# Patient Record
Sex: MALE | Race: WHITE | NOT HISPANIC OR LATINO | Employment: FULL TIME | URBAN - METROPOLITAN AREA
[De-identification: names, ages, dates, MRNs, and addresses within clinical notes are randomized per-mention and may not be internally consistent; named-entity substitution may affect disease eponyms.]

---

## 2022-09-26 ENCOUNTER — APPOINTMENT (OUTPATIENT)
Dept: GENERAL RADIOLOGY | Facility: CLINIC | Age: 32
End: 2022-09-26
Attending: FAMILY MEDICINE
Payer: COMMERCIAL

## 2022-09-26 ENCOUNTER — HOSPITAL ENCOUNTER (EMERGENCY)
Facility: CLINIC | Age: 32
Discharge: HOME | End: 2022-09-26
Attending: FAMILY MEDICINE | Admitting: FAMILY MEDICINE
Payer: COMMERCIAL

## 2022-09-26 VITALS
SYSTOLIC BLOOD PRESSURE: 134 MMHG | DIASTOLIC BLOOD PRESSURE: 98 MMHG | RESPIRATION RATE: 18 BRPM | WEIGHT: 182 LBS | HEART RATE: 62 BPM | BODY MASS INDEX: 23.69 KG/M2 | TEMPERATURE: 97.6 F | OXYGEN SATURATION: 99 %

## 2022-09-26 DIAGNOSIS — R07.9 CHEST PAIN, UNSPECIFIED TYPE: ICD-10-CM

## 2022-09-26 LAB
ALBUMIN SERPL-MCNC: 3.9 G/DL (ref 3.4–5)
ALP SERPL-CCNC: 60 U/L (ref 40–150)
ALT SERPL W P-5'-P-CCNC: 39 U/L (ref 0–70)
ANION GAP SERPL CALCULATED.3IONS-SCNC: 8 MMOL/L (ref 3–14)
APTT PPP: 27 SECONDS (ref 22–38)
AST SERPL W P-5'-P-CCNC: 27 U/L (ref 0–45)
ATRIAL RATE - MUSE: 72 BPM
BASOPHILS # BLD AUTO: 0.1 10E3/UL (ref 0–0.2)
BASOPHILS NFR BLD AUTO: 1 %
BILIRUB SERPL-MCNC: 0.6 MG/DL (ref 0.2–1.3)
BUN SERPL-MCNC: 9 MG/DL (ref 7–30)
CALCIUM SERPL-MCNC: 9.2 MG/DL (ref 8.5–10.1)
CHLORIDE BLD-SCNC: 104 MMOL/L (ref 94–109)
CO2 SERPL-SCNC: 26 MMOL/L (ref 20–32)
CREAT SERPL-MCNC: 0.64 MG/DL (ref 0.66–1.25)
D DIMER PPP FEU-MCNC: <0.27 UG/ML FEU (ref 0–0.5)
DIASTOLIC BLOOD PRESSURE - MUSE: NORMAL MMHG
EOSINOPHIL # BLD AUTO: 0.1 10E3/UL (ref 0–0.7)
EOSINOPHIL NFR BLD AUTO: 2 %
ERYTHROCYTE [DISTWIDTH] IN BLOOD BY AUTOMATED COUNT: 12.8 % (ref 10–15)
GFR SERPL CREATININE-BSD FRML MDRD: >90 ML/MIN/1.73M2
GLUCOSE BLD-MCNC: 108 MG/DL (ref 70–99)
HCT VFR BLD AUTO: 44.3 % (ref 40–53)
HGB BLD-MCNC: 15.4 G/DL (ref 13.3–17.7)
HOLD SPECIMEN: NORMAL
IMM GRANULOCYTES # BLD: 0 10E3/UL
IMM GRANULOCYTES NFR BLD: 0 %
INR PPP: 0.94 (ref 0.85–1.15)
INTERPRETATION ECG - MUSE: NORMAL
LYMPHOCYTES # BLD AUTO: 1.2 10E3/UL (ref 0.8–5.3)
LYMPHOCYTES NFR BLD AUTO: 20 %
MCH RBC QN AUTO: 32 PG (ref 26.5–33)
MCHC RBC AUTO-ENTMCNC: 34.8 G/DL (ref 31.5–36.5)
MCV RBC AUTO: 92 FL (ref 78–100)
MONOCYTES # BLD AUTO: 0.5 10E3/UL (ref 0–1.3)
MONOCYTES NFR BLD AUTO: 8 %
NEUTROPHILS # BLD AUTO: 4.2 10E3/UL (ref 1.6–8.3)
NEUTROPHILS NFR BLD AUTO: 69 %
NRBC # BLD AUTO: 0 10E3/UL
NRBC BLD AUTO-RTO: 0 /100
NT-PROBNP SERPL-MCNC: 34 PG/ML (ref 0–450)
P AXIS - MUSE: 58 DEGREES
PLATELET # BLD AUTO: 287 10E3/UL (ref 150–450)
POTASSIUM BLD-SCNC: 3.9 MMOL/L (ref 3.4–5.3)
PR INTERVAL - MUSE: 152 MS
PROT SERPL-MCNC: 7.3 G/DL (ref 6.8–8.8)
QRS DURATION - MUSE: 90 MS
QT - MUSE: 374 MS
QTC - MUSE: 409 MS
R AXIS - MUSE: 72 DEGREES
RBC # BLD AUTO: 4.82 10E6/UL (ref 4.4–5.9)
SARS-COV-2 RNA RESP QL NAA+PROBE: NEGATIVE
SODIUM SERPL-SCNC: 138 MMOL/L (ref 133–144)
SYSTOLIC BLOOD PRESSURE - MUSE: NORMAL MMHG
T AXIS - MUSE: 50 DEGREES
TROPONIN I SERPL HS-MCNC: 5 NG/L
TROPONIN I SERPL HS-MCNC: 5 NG/L
VENTRICULAR RATE- MUSE: 72 BPM
WBC # BLD AUTO: 6 10E3/UL (ref 4–11)

## 2022-09-26 PROCEDURE — 250N000009 HC RX 250: Performed by: FAMILY MEDICINE

## 2022-09-26 PROCEDURE — U0003 INFECTIOUS AGENT DETECTION BY NUCLEIC ACID (DNA OR RNA); SEVERE ACUTE RESPIRATORY SYNDROME CORONAVIRUS 2 (SARS-COV-2) (CORONAVIRUS DISEASE [COVID-19]), AMPLIFIED PROBE TECHNIQUE, MAKING USE OF HIGH THROUGHPUT TECHNOLOGIES AS DESCRIBED BY CMS-2020-01-R: HCPCS | Performed by: FAMILY MEDICINE

## 2022-09-26 PROCEDURE — 80053 COMPREHEN METABOLIC PANEL: CPT | Performed by: FAMILY MEDICINE

## 2022-09-26 PROCEDURE — 71046 X-RAY EXAM CHEST 2 VIEWS: CPT

## 2022-09-26 PROCEDURE — 83880 ASSAY OF NATRIURETIC PEPTIDE: CPT | Performed by: FAMILY MEDICINE

## 2022-09-26 PROCEDURE — 96374 THER/PROPH/DIAG INJ IV PUSH: CPT | Performed by: FAMILY MEDICINE

## 2022-09-26 PROCEDURE — C9803 HOPD COVID-19 SPEC COLLECT: HCPCS | Performed by: FAMILY MEDICINE

## 2022-09-26 PROCEDURE — 99285 EMERGENCY DEPT VISIT HI MDM: CPT | Mod: 25 | Performed by: FAMILY MEDICINE

## 2022-09-26 PROCEDURE — 258N000003 HC RX IP 258 OP 636: Performed by: FAMILY MEDICINE

## 2022-09-26 PROCEDURE — 85610 PROTHROMBIN TIME: CPT | Performed by: FAMILY MEDICINE

## 2022-09-26 PROCEDURE — 250N000013 HC RX MED GY IP 250 OP 250 PS 637: Performed by: FAMILY MEDICINE

## 2022-09-26 PROCEDURE — 93010 ELECTROCARDIOGRAM REPORT: CPT | Performed by: FAMILY MEDICINE

## 2022-09-26 PROCEDURE — 36415 COLL VENOUS BLD VENIPUNCTURE: CPT | Performed by: FAMILY MEDICINE

## 2022-09-26 PROCEDURE — 84484 ASSAY OF TROPONIN QUANT: CPT | Performed by: FAMILY MEDICINE

## 2022-09-26 PROCEDURE — 85025 COMPLETE CBC W/AUTO DIFF WBC: CPT | Performed by: FAMILY MEDICINE

## 2022-09-26 PROCEDURE — 85730 THROMBOPLASTIN TIME PARTIAL: CPT | Performed by: FAMILY MEDICINE

## 2022-09-26 PROCEDURE — 250N000011 HC RX IP 250 OP 636: Performed by: FAMILY MEDICINE

## 2022-09-26 PROCEDURE — 85379 FIBRIN DEGRADATION QUANT: CPT | Performed by: FAMILY MEDICINE

## 2022-09-26 PROCEDURE — 93005 ELECTROCARDIOGRAM TRACING: CPT | Performed by: FAMILY MEDICINE

## 2022-09-26 PROCEDURE — 96361 HYDRATE IV INFUSION ADD-ON: CPT | Performed by: FAMILY MEDICINE

## 2022-09-26 RX ORDER — NITROGLYCERIN 0.4 MG/1
0.4 TABLET SUBLINGUAL ONCE
Status: COMPLETED | OUTPATIENT
Start: 2022-09-26 | End: 2022-09-26

## 2022-09-26 RX ORDER — LIDOCAINE 40 MG/G
CREAM TOPICAL
Status: DISCONTINUED | OUTPATIENT
Start: 2022-09-26 | End: 2022-09-26 | Stop reason: HOSPADM

## 2022-09-26 RX ORDER — KETOROLAC TROMETHAMINE 15 MG/ML
15 INJECTION, SOLUTION INTRAMUSCULAR; INTRAVENOUS ONCE
Status: COMPLETED | OUTPATIENT
Start: 2022-09-26 | End: 2022-09-26

## 2022-09-26 RX ORDER — ASPIRIN 81 MG/1
324 TABLET, CHEWABLE ORAL ONCE
Status: COMPLETED | OUTPATIENT
Start: 2022-09-26 | End: 2022-09-26

## 2022-09-26 RX ADMIN — SODIUM CHLORIDE 1000 ML: 9 INJECTION, SOLUTION INTRAVENOUS at 11:48

## 2022-09-26 RX ADMIN — NITROGLYCERIN 0.4 MG: 0.4 TABLET SUBLINGUAL at 13:59

## 2022-09-26 RX ADMIN — LIDOCAINE HYDROCHLORIDE 30 ML: 20 SOLUTION ORAL; TOPICAL at 12:54

## 2022-09-26 RX ADMIN — KETOROLAC TROMETHAMINE 15 MG: 15 INJECTION, SOLUTION INTRAMUSCULAR; INTRAVENOUS at 14:48

## 2022-09-26 RX ADMIN — ASPIRIN 81 MG CHEWABLE TABLET 324 MG: 81 TABLET CHEWABLE at 11:21

## 2022-09-26 ASSESSMENT — ENCOUNTER SYMPTOMS
NERVOUS/ANXIOUS: 0
HEADACHES: 0
WOUND: 0
BRUISES/BLEEDS EASILY: 0
GASTROINTESTINAL NEGATIVE: 1
APPETITE CHANGE: 0
HALLUCINATIONS: 0
BACK PAIN: 0
SINUS PAIN: 0
DYSPHORIC MOOD: 0
SHORTNESS OF BREATH: 0
FLANK PAIN: 0
RHINORRHEA: 0
FEVER: 0
ARTHRALGIAS: 0
LIGHT-HEADEDNESS: 0
NAUSEA: 0
WEAKNESS: 0
CONFUSION: 0
TROUBLE SWALLOWING: 0
COUGH: 0
ACTIVITY CHANGE: 0
DECREASED CONCENTRATION: 0
DIAPHORESIS: 0
ABDOMINAL PAIN: 0

## 2022-09-26 ASSESSMENT — ACTIVITIES OF DAILY LIVING (ADL)
ADLS_ACUITY_SCORE: 35

## 2022-09-26 NOTE — ED TRIAGE NOTES
Onset of dull aching L sided chest pain since last night that started when watching TV. Reports bending over makes the pain worse reports current 4/10.  Denies dyspnea or increased pain with deep breaths.     Hx of thoracic outlet syndrome which caused a PE 4-5 years ago.      Triage Assessment     Row Name 09/26/22 1055       Triage Assessment (Adult)    Airway WDL WDL       Respiratory WDL    Respiratory WDL WDL       Skin Circulation/Temperature WDL    Skin Circulation/Temperature WDL WDL       Cardiac WDL    Cardiac WDL WDL       Peripheral/Neurovascular WDL    Peripheral Neurovascular WDL WDL       Cognitive/Neuro/Behavioral WDL    Cognitive/Neuro/Behavioral WDL WDL

## 2022-09-26 NOTE — ED PROVIDER NOTES
[facility] EMERGENCY DEPARTMENT ([facility])     September 26, 2022     History     Chief Complaint   Patient presents with     Chest Pain     HPI  Thomas Bustamante is a 31 year old male with a past medical history including DVT who presents to the Emergency Department for evaluation of chest pain. Patient reports that last night he was watching a movie when he noticed aching pain and pressure in his left-sided chest. He states that this pain is worse when he bends over and is somewhat better when he lies down. He has had similar pain in the past, but denies history of heart problems or family history of heart problems. He says he did have DVT in his right upper extremity around 5 years ago due to vascular thoracic outlet syndrome. He had a right rib removed and has not had DVT problems since. He is not on any blood thinners.    Patient denies having asthma or known recent COVID-19 infection. His chest pain does not make him short of breath. No recent injuries, rashes, or heavy lifting. He denies acid reflux or GI problems, leg swelling, nausea, sweatiness, or headache.    Patient still describing some pressure-like sensation left upper chest area.  As noted no rash no other trauma.  Patient wants to make sure there is nothing such as blood clot etc.    Past Medical History  Past Medical History:   Diagnosis Date     Allergic state     Cats and pollen     Pulmonary embolism (H)      Thoracic outlet syndrome      Past Surgical History:   Procedure Laterality Date     APPENDECTOMY       FIRST RIB REMOVAL Right      HC CLOSED TX NASAL BONE FRACTURE W/ STABILIZATION       thoracic outlet syndrome surgery       buPROPion HCl (WELLBUTRIN XL PO)  Escitalopram Oxalate (LEXAPRO PO)      No Known Allergies  Family History  Family History   Problem Relation Age of Onset     Family History Negative No family hx of      Social History   Social History     Tobacco Use     Smoking status: Former Smoker      Smokeless tobacco: Never Used   Substance Use Topics     Alcohol use: Yes     Comment: socially     Drug use: No      Past medical history, past surgical history, medications, allergies, family history, and social history were reviewed with the patient. No additional pertinent items.       Review of Systems   Constitutional: Negative for activity change, appetite change, diaphoresis and fever.   HENT: Negative for rhinorrhea, sinus pain and trouble swallowing.    Eyes: Negative for visual disturbance.   Respiratory: Negative for cough and shortness of breath.    Cardiovascular: Positive for chest pain. Negative for leg swelling.        Left upper anterior chest pain somewhat pressure   Gastrointestinal: Negative.  Negative for abdominal pain and nausea.   Genitourinary: Negative for flank pain.   Musculoskeletal: Negative for arthralgias, back pain and gait problem.   Skin: Negative for rash and wound.   Allergic/Immunologic: Negative for immunocompromised state.   Neurological: Negative for syncope, weakness, light-headedness and headaches.   Hematological: Does not bruise/bleed easily.   Psychiatric/Behavioral: Negative for confusion, decreased concentration, dysphoric mood and hallucinations. The patient is not nervous/anxious.    All other systems reviewed and are negative.    A complete review of systems was performed with pertinent positives and negatives noted in the HPI, and all other systems negative.    Physical Exam   BP: (!) 144/106  Pulse: 78  Temp: 97.6  F (36.4  C)  Resp: 18  Weight: 82.6 kg (182 lb)  SpO2: 100 %  Physical Exam  Vitals and nursing note reviewed.   Constitutional:       General: He is in acute distress.      Appearance: He is well-developed. He is not toxic-appearing or diaphoretic.      Comments: Patient alert and orient x3.  Is not writhing in pain is not diaphoretic laying flat comfortable vital signs stable.   HENT:      Head: Normocephalic and atraumatic.      Nose: Nose normal.       Mouth/Throat:      Mouth: Mucous membranes are moist.   Eyes:      General: No scleral icterus.     Extraocular Movements: Extraocular movements intact.      Conjunctiva/sclera: Conjunctivae normal.      Pupils: Pupils are equal, round, and reactive to light.   Cardiovascular:      Rate and Rhythm: Normal rate and regular rhythm.      Heart sounds: No murmur heard.    No friction rub.   Pulmonary:      Effort: No respiratory distress.      Breath sounds: Normal breath sounds. No stridor.   Chest:      Chest wall: No tenderness.   Abdominal:      Palpations: Abdomen is soft.      Tenderness: There is no abdominal tenderness.   Musculoskeletal:         General: No swelling or tenderness.      Cervical back: Normal range of motion and neck supple.      Right lower leg: No edema.      Left lower leg: No edema.      Comments: Negative Homans' sign      Skin:     General: Skin is warm and dry.      Capillary Refill: Capillary refill takes less than 2 seconds.      Findings: No rash.   Neurological:      General: No focal deficit present.      Mental Status: He is alert and oriented to person, place, and time. Mental status is at baseline.   Psychiatric:      Comments: Appropriate here in the ER.         ED Course     10:57 AM  The patient was seen and examined by Estevan Gracia MD in Room ED02.   Patient valuated here in the ER EKG shows a normal sinus rhythm rate of 72 no sign of any ischemic changes no early polarization no hyperacute abnormalities block etc.  No tachycardia.  Patient vitally stable oxygen saturations 99% on room air.  Patient had chest x-ray done normal findings no pneumothorax no effusion no other abnormalities  Patient had labs drawn including a troponin x2 which were negative both at 5.  BNP is 34.  D-dimer was negative also COVID testing negative.  Sodium 138 potassium noted to be 3.9.  Bicarb 26 gap is 8 creatinine 0.64 glucose 108.  Liver function test normal image White count 6 hemoglobin  15.4.  INR 0.94.    In the ER patient given GI cocktail without significant change given nitro sublingual x1 equivocal results at this point patient given Toradol 15 mg IV feeling better at this point with the EKG normal no tachycardia D-dimer negative chest x-ray normal 2 troponins negative without any other associated symptoms low likely that this is cardiac I think at this point it is noncardiac in a be more chest wall esophageal etc.  Patient here in the ER is comfortable going home did offer him staying for further evaluation but is comfortable he has a primary doctor he can follow-up with.  Should return if any worsening symptoms at all.  Patient then discharged feeling fine.     Procedures            EKG Interpretation:      Interpreted by Estevan Gracia MD  Time reviewed: 10:51:16  Symptoms at time of EKG: chest pain   Rhythm: normal sinus   Rate: 72 bpm  Axis: normal  Ectopy: none  Conduction: normal  ST Segments/ T Waves: No ST-T wave changes  Q Waves: none  Comparison to prior: No old EKG available    Clinical Impression: normal EKG                    Results for orders placed or performed during the hospital encounter of 09/26/22   XR Chest 2 Views     Status: None    Narrative    XR CHEST 2 VIEWS 9/26/2022 12:36 PM    HISTORY: cp    COMPARISON: None.        Impression    IMPRESSION: Surgical clips projecting over the right lung apex. No  infiltrate, pleural effusion or pneumothorax. The cardiac and  mediastinal silhouettes are normal.    ORA CANDELARIA MD         SYSTEM ID:  [id number]   [place] Draw     Status: None    Narrative    The following orders were created for panel order [place] Draw.  Procedure                               Abnormality         Status                     ---------                               -----------         ------                     Extra Blue Top Tube[[id number]]                              Final result               Extra Red Top Tube[[id number]]                                Final result               Extra Green Top (Lithium...[[id number]]                      Final result               Extra Purple Top Tube[[id number]]                            Final result                 Please view results for these tests on the individual orders.   INR     Status: Normal   Result Value Ref Range    INR 0.94 0.85 - 1.15   Partial thromboplastin time     Status: Normal   Result Value Ref Range    aPTT 27 22 - 38 Seconds   D dimer quantitative     Status: Normal   Result Value Ref Range    D-Dimer Quantitative <0.27 0.00 - 0.50 ug/mL FEU    Narrative    This D-dimer assay is intended for use in conjunction with a clinical pretest probability assessment model to exclude pulmonary embolism (PE) and deep venous thrombosis (DVT) in outpatients suspected of PE or DVT. The cut-off value is 0.50 ug/mL FEU.   Comprehensive metabolic panel     Status: Abnormal   Result Value Ref Range    Sodium 138 133 - 144 mmol/L    Potassium 3.9 3.4 - 5.3 mmol/L    Chloride 104 94 - 109 mmol/L    Carbon Dioxide (CO2) 26 20 - 32 mmol/L    Anion Gap 8 3 - 14 mmol/L    Urea Nitrogen 9 7 - 30 mg/dL    Creatinine 0.64 (L) 0.66 - 1.25 mg/dL    Calcium 9.2 8.5 - 10.1 mg/dL    Glucose 108 (H) 70 - 99 mg/dL    Alkaline Phosphatase 60 40 - 150 U/L    AST 27 0 - 45 U/L    ALT 39 0 - 70 U/L    Protein Total 7.3 6.8 - 8.8 g/dL    Albumin 3.9 3.4 - 5.0 g/dL    Bilirubin Total 0.6 0.2 - 1.3 mg/dL    GFR Estimate >90 >60 mL/min/1.73m2   Troponin I     Status: Normal   Result Value Ref Range    Troponin I High Sensitivity 5 <79 ng/L   Nt probnp inpatient (BNP)     Status: Normal   Result Value Ref Range    N terminal Pro BNP Inpatient 34 0 - 450 pg/mL   Asymptomatic COVID-19 Virus (Coronavirus) by PCR Nasopharyngeal     Status: Normal    Specimen: Nasopharyngeal; Swab   Result Value Ref Range    SARS CoV2 PCR Negative Negative    Narrative    Testing was performed using the Xpert Xpress SARS-CoV-2 Assay on the   Cepheid Gene-Xpert  Instrument Systems. Additional information about   this Emergency Use Authorization (EUA) assay can be found via the Lab   Guide. This test should be ordered for the detection of SARS-CoV-2 in   individuals who meet SARS-CoV-2 clinical and/or epidemiological   criteria. Test performance is unknown in asymptomatic patients. This   test is for in vitro diagnostic use under the FDA EUA for   laboratories certified under CLIA to perform high complexity testing.   This test has not been FDA cleared or approved. A negative result   does not rule out the presence of PCR inhibitors in the specimen or   target RNA in concentration below the limit of detection for the   assay. The possibility of a false negative should be considered if   the patient's recent exposure or clinical presentation suggests   COVID-19. This test was validated by the [facility]. This laboratory is certified under the Clinical Laboratory Improvement Amendments of 1988 (CLIA-88) as qualified to perform high complexity laboratory testing.     Extra Blue Top Tube     Status: None   Result Value Ref Range    Hold Specimen JIC    Extra Red Top Tube     Status: None   Result Value Ref Range    Hold Specimen JIC    Extra Green Top (Lithium Heparin) Tube     Status: None   Result Value Ref Range    Hold Specimen JIC    Extra Purple Top Tube     Status: None   Result Value Ref Range    Hold Specimen     CBC with platelets and differential     Status: None   Result Value Ref Range    WBC Count 6.0 4.0 - 11.0 10e3/uL    RBC Count 4.82 4.40 - 5.90 10e6/uL    Hemoglobin 15.4 13.3 - 17.7 g/dL    Hematocrit 44.3 40.0 - 53.0 %    MCV 92 78 - 100 fL    MCH 32.0 26.5 - 33.0 pg    MCHC 34.8 31.5 - 36.5 g/dL    RDW 12.8 10.0 - 15.0 %    Platelet Count 287 150 - 450 10e3/uL    % Neutrophils 69 %    % Lymphocytes 20 %    % Monocytes 8 %    % Eosinophils 2 %    % Basophils 1 %    % Immature Granulocytes 0 %    NRBCs per 100 WBC 0 <1 /100     Absolute Neutrophils 4.2 1.6 - 8.3 10e3/uL    Absolute Lymphocytes 1.2 0.8 - 5.3 10e3/uL    Absolute Monocytes 0.5 0.0 - 1.3 10e3/uL    Absolute Eosinophils 0.1 0.0 - 0.7 10e3/uL    Absolute Basophils 0.1 0.0 - 0.2 10e3/uL    Absolute Immature Granulocytes 0.0 <=0.4 10e3/uL    Absolute NRBCs 0.0 10e3/uL   Troponin I     Status: Normal   Result Value Ref Range    Troponin I High Sensitivity 5 <79 ng/L   EKG 12 lead     Status: None   Result Value Ref Range    Systolic Blood Pressure  mmHg    Diastolic Blood Pressure  mmHg    Ventricular Rate 72 BPM    Atrial Rate 72 BPM    MO Interval 152 ms    QRS Duration 90 ms     ms    QTc 409 ms    P Axis 58 degrees    R AXIS 72 degrees    T Axis 50 degrees    Interpretation ECG       Sinus rhythm  Normal ECG  Unconfirmed report - interpretation of this ECG is computer generated - see medical record for final interpretation  Confirmed by - EMERGENCY ROOM, PHYSICIAN ([id number]),  SAJAN ISRAEL ([id number]) on 9/26/2022 4:37:16 PM     CBC with platelets differential     Status: None    Narrative    The following orders were created for panel order CBC with platelets differential.  Procedure                               Abnormality         Status                     ---------                               -----------         ------                     CBC with platelets and d...[[id number]]                      Final result                 Please view results for these tests on the individual orders.     Medications   0.9% sodium chloride BOLUS (0 mLs Intravenous Stopped 9/26/22 1230)   aspirin (ASA) chewable tablet 324 mg (324 mg Oral Given 9/26/22 1121)   lidocaine (viscous) (XYLOCAINE) 2 % 15 mL, alum & mag hydroxide-simethicone (MAALOX) 15 mL GI Cocktail (30 mLs Oral Given 9/26/22 1254)   nitroGLYcerin (NITROSTAT) sublingual tablet 0.4 mg (0.4 mg Sublingual Given 9/26/22 1359)   ketorolac (TORADOL) injection 15 mg (15 mg Intravenous Given 9/26/22 1448)        Assessments  & Plan (with Medical Decision Making)  31-year-old male presents to the ER with left chest pain.  Patient describes somewhat of a pressure sensation left chest without radiation no shortness of breath no diaphoresis seems to feel better when he lays down.  No GI symptoms otherwise no cough cold no history of known cardiac disease patient years ago had a DVT or PE after surgery for right thoracic outlet syndrome but no other history or family history of this since.  He is not on currently on anticoagulants.  Patient evaluation in the ER reveals normal sinus rhythm no tachycardia vitals are stable no chest wall tenderness no rash otherwise no murmur or rub heard.  Chest x-ray negative troponin x2 negative D-dimer negative.  Other labs all normal COVID-negative.  Patient given GI cocktail without significant changes nitro possibly helped slightly Toradol did help also.  Here in the ER patient appears very comfortable feel that the symptoms are noncardiac may be more chest wall etc. patient comfortable in home monitoring symptoms follow-up with MD return if any worsening symptoms at all.  Patient agrees with plan reading a book having no symptoms currently.       I have reviewed the nursing notes. I have reviewed the findings, diagnosis, plan and need for follow up with the patient.    Discharge Medication List as of 9/26/2022  4:06 PM          Final diagnoses:   Chest pain, unspecified type     I, Nancie iSlva, am serving as a trained medical scribe to document services personally performed by Estevan Gracia MD, based on the provider's statements to me.   I, Estevan Gracia MD, was physically present and have reviewed and verified the accuracy of this note documented by Nancie Silva.    --  Estevan REDDY [facility] EMERGENCY DEPARTMENT  9/26/2022      This note was created at least in part by the use of dragon voice dictation system. Inadvertent typographical errors may still exist.  Estevan Gracia  MD.    Patient evaluated in the emergency department during the COVID-19 pandemic period. Careful attention to patients safety was addressed throughout the evaluation. Evaluation and treatment management was initiated with disposition made efficiently and appropriate as possible to minimize any risk of potential exposure to patient during this evaluation.       Estevan Gracia MD  09/26/22 4413

## 2022-09-26 NOTE — DISCHARGE INSTRUCTIONS
Home.  Your xray and labs and ekg did not show sign of any blood clots, heart injury or infection.  Possible GI or chest wall.  Treat symptoms with tylenol and mylanta and observation.  Follow up with MD if any concerns or any changes.  Return if any concerns.    Results for orders placed or performed during the hospital encounter of 09/26/22   XR Chest 2 Views     Status: None    Narrative    XR CHEST 2 VIEWS 9/26/2022 12:36 PM    HISTORY: cp    COMPARISON: None.        Impression    IMPRESSION: Surgical clips projecting over the right lung apex. No  infiltrate, pleural effusion or pneumothorax. The cardiac and  mediastinal silhouettes are normal.    ORA CANDELARIA MD         SYSTEM ID:  [id number]   [place] Draw     Status: None    Narrative    The following orders were created for panel order [place] Draw.  Procedure                               Abnormality         Status                     ---------                               -----------         ------                     Extra Blue Top Tube[[id number]]                              Final result               Extra Red Top Tube[[id number]]                               Final result               Extra Green Top (Lithium...[[id number]]                      Final result               Extra Purple Top Tube[[id number]]                            Final result                 Please view results for these tests on the individual orders.   INR     Status: Normal   Result Value Ref Range    INR 0.94 0.85 - 1.15   Partial thromboplastin time     Status: Normal   Result Value Ref Range    aPTT 27 22 - 38 Seconds   D dimer quantitative     Status: Normal   Result Value Ref Range    D-Dimer Quantitative <0.27 0.00 - 0.50 ug/mL FEU    Narrative    This D-dimer assay is intended for use in conjunction with a clinical pretest probability assessment model to exclude pulmonary embolism (PE) and deep venous thrombosis (DVT) in outpatients suspected of PE or DVT. The cut-off value is  0.50 ug/mL FEU.   Comprehensive metabolic panel     Status: Abnormal   Result Value Ref Range    Sodium 138 133 - 144 mmol/L    Potassium 3.9 3.4 - 5.3 mmol/L    Chloride 104 94 - 109 mmol/L    Carbon Dioxide (CO2) 26 20 - 32 mmol/L    Anion Gap 8 3 - 14 mmol/L    Urea Nitrogen 9 7 - 30 mg/dL    Creatinine 0.64 (L) 0.66 - 1.25 mg/dL    Calcium 9.2 8.5 - 10.1 mg/dL    Glucose 108 (H) 70 - 99 mg/dL    Alkaline Phosphatase 60 40 - 150 U/L    AST 27 0 - 45 U/L    ALT 39 0 - 70 U/L    Protein Total 7.3 6.8 - 8.8 g/dL    Albumin 3.9 3.4 - 5.0 g/dL    Bilirubin Total 0.6 0.2 - 1.3 mg/dL    GFR Estimate >90 >60 mL/min/1.73m2   Troponin I     Status: Normal   Result Value Ref Range    Troponin I High Sensitivity 5 <79 ng/L   Nt probnp inpatient (BNP)     Status: Normal   Result Value Ref Range    N terminal Pro BNP Inpatient 34 0 - 450 pg/mL   Asymptomatic COVID-19 Virus (Coronavirus) by PCR Nasopharyngeal     Status: Normal    Specimen: Nasopharyngeal; Swab   Result Value Ref Range    SARS CoV2 PCR Negative Negative    Narrative    Testing was performed using the Xpert Xpress SARS-CoV-2 Assay on the   Cepheid Gene-Xpert Instrument Systems. Additional information about   this Emergency Use Authorization (EUA) assay can be found via the Lab   Guide. This test should be ordered for the detection of SARS-CoV-2 in   individuals who meet SARS-CoV-2 clinical and/or epidemiological   criteria. Test performance is unknown in asymptomatic patients. This   test is for in vitro diagnostic use under the FDA EUA for   laboratories certified under CLIA to perform high complexity testing.   This test has not been FDA cleared or approved. A negative result   does not rule out the presence of PCR inhibitors in the specimen or   target RNA in concentration below the limit of detection for the   assay. The possibility of a false negative should be considered if   the patient's recent exposure or clinical presentation suggests   COVID-19. This  test was validated by the [facility]. This laboratory is certified under the Clinical Laboratory Improvement Amendments of 1988 (CLIA-88) as qualified to perform high complexity laboratory testing.     Extra Blue Top Tube     Status: None   Result Value Ref Range    Hold Specimen JIC    Extra Red Top Tube     Status: None   Result Value Ref Range    Hold Specimen JIC    Extra Green Top (Lithium Heparin) Tube     Status: None   Result Value Ref Range    Hold Specimen JIC    Extra Purple Top Tube     Status: None   Result Value Ref Range    Hold Specimen     CBC with platelets and differential     Status: None   Result Value Ref Range    WBC Count 6.0 4.0 - 11.0 10e3/uL    RBC Count 4.82 4.40 - 5.90 10e6/uL    Hemoglobin 15.4 13.3 - 17.7 g/dL    Hematocrit 44.3 40.0 - 53.0 %    MCV 92 78 - 100 fL    MCH 32.0 26.5 - 33.0 pg    MCHC 34.8 31.5 - 36.5 g/dL    RDW 12.8 10.0 - 15.0 %    Platelet Count 287 150 - 450 10e3/uL    % Neutrophils 69 %    % Lymphocytes 20 %    % Monocytes 8 %    % Eosinophils 2 %    % Basophils 1 %    % Immature Granulocytes 0 %    NRBCs per 100 WBC 0 <1 /100    Absolute Neutrophils 4.2 1.6 - 8.3 10e3/uL    Absolute Lymphocytes 1.2 0.8 - 5.3 10e3/uL    Absolute Monocytes 0.5 0.0 - 1.3 10e3/uL    Absolute Eosinophils 0.1 0.0 - 0.7 10e3/uL    Absolute Basophils 0.1 0.0 - 0.2 10e3/uL    Absolute Immature Granulocytes 0.0 <=0.4 10e3/uL    Absolute NRBCs 0.0 10e3/uL   Troponin I     Status: Normal   Result Value Ref Range    Troponin I High Sensitivity 5 <79 ng/L   EKG 12 lead     Status: None (Preliminary result)   Result Value Ref Range    Systolic Blood Pressure  mmHg    Diastolic Blood Pressure  mmHg    Ventricular Rate 72 BPM    Atrial Rate 72 BPM    UT Interval 152 ms    QRS Duration 90 ms     ms    QTc 409 ms    P Axis 58 degrees    R AXIS 72 degrees    T Axis 50 degrees    Interpretation ECG Sinus rhythm  Normal ECG      CBC with platelets differential      Status: None    Narrative    The following orders were created for panel order CBC with platelets differential.  Procedure                               Abnormality         Status                     ---------                               -----------         ------                     CBC with platelets and d...[[id number]]                      Final result                 Please view results for these tests on the individual orders.